# Patient Record
(demographics unavailable — no encounter records)

---

## 2025-01-07 NOTE — ASSESSMENT
[FreeTextEntry1] : Continue local wound care and pressure offloading.   Some excisional debridement of fibrinous exudate performed today and several sutures were removed.   Switch from Bacitracin to Medihoney.  Will see if collagenase is possible to cleanup wound base to facilitate healing by secondary intent.   Follow up 1 week.

## 2025-01-07 NOTE — HISTORY OF PRESENT ILLNESS
[de-identified] : 77 yo male s/p ray amputation of 1st toe, left foot.  Presents for follow up.    Denies pain.    On evaluation, there was tissue necrosis of the skin edges of the flap at the amputation site.  There is a layer of fibrinous exudate overlying the amputation site, with some granulation tissue underneath.  The lateral incision site is relatively well approximated with minimal separation and slough.  No purulence or drainage.

## 2025-01-07 NOTE — PHYSICAL EXAM
[de-identified] : NAD [de-identified] : DELFINO [de-identified] : Left foot: alicia scaling skin.  1st toe amputation site described as above in the HPI.  Opening is aobut 3x3 cm in total size.

## 2025-01-15 NOTE — PHYSICAL EXAM
[de-identified] : NAD [de-identified] : DELFINO [de-identified] : Left foot: alicia scaling skin.  1st toe amputation site described as above in the HPI.  Opening is aobut 3x3 cm in total size.  Fibrinous exudate at the base.

## 2025-01-15 NOTE — HISTORY OF PRESENT ILLNESS
[de-identified] : 75 yo male s/p ray amputation of 1st toe, left foot.  Presents for follow up.    Denies pain.    On evaluation, there was tissue necrosis of the skin edges of the flap at the amputation site.  There is a layer of fibrinous exudate overlying the amputation site, with some granulation tissue underneath.  The lateral incision site is relatively well approximated with minimal separation and slough.  No purulence or drainage.   [de-identified] : 1/15: here for wound check.  Sutures removed from the foot.  Less exudate in the wound bed.  Some sharp excisional debridement performed.  Open wound, triangular in shape about 3 cm in largest dimension.  Some separation of the incision along the wound flap.    Also note three areas of deep tissue injury, 2 on the dorsal surface of the foot and a third along the lateral aspect, MTP prominence 5th toe.  He also is developing a pressure ulcer at the medial malleolus from abrasion from the boot.

## 2025-01-15 NOTE — ASSESSMENT
[FreeTextEntry1] : Deep tissue injuries on the foot in addition to open wound.  Not infected. Continue local wound care.   Close monitoring of the DTI Recommend vascular surgery evaluation  Follow up 1 week

## 2025-01-22 NOTE — HISTORY OF PRESENT ILLNESS
[de-identified] : 77 yo male s/p ray amputation of 1st toe, left foot.  Presents for follow up.    Denies pain.    On evaluation, there was tissue necrosis of the skin edges of the flap at the amputation site.  There is a layer of fibrinous exudate overlying the amputation site, with some granulation tissue underneath.  The lateral incision site is relatively well approximated with minimal separation and slough.  No purulence or drainage.   [de-identified] : 1/15: here for wound check.  Sutures removed from the foot.  Less exudate in the wound bed.  Some sharp excisional debridement performed.  Open wound, triangular in shape about 3 cm in largest dimension.  Some separation of the incision along the wound flap.    Also note three areas of deep tissue injury, 2 on the dorsal surface of the foot and a third along the lateral aspect, MTP prominence 5th toe.  He also is developing a pressure ulcer at the medial malleolus from abrasion from the boot.    1/22: No major complaints.  Going to see primary and vascular surgery this week.

## 2025-01-22 NOTE — PHYSICAL EXAM
[de-identified] : NAD [de-identified] : DELFINO [de-identified] : Left foot: alicia scaling skin.  Wound opening remains 3x3 triangular shaped.  Minimal exudate. Granulation tissue forming.  No cellulitis.  Lateral foot: 2 stage 2 pressure ulcers, 2x2 and 2x1.  There are two stable scabs on the dorsum of the foot and a 1 x1 cm pressure ulcer on the medial malleolus.  All from pressure.

## 2025-01-22 NOTE — ASSESSMENT
[FreeTextEntry1] : Amputation site is healing appropriately by secondary intent.  Continue medihoney.   Deep tissue issues from pressure on the multiple parts of the left foot.  Padding when ambulating.   Referral to vascular surgery for arterial evaluation follow up 2 weeks.

## 2025-02-05 NOTE — HISTORY OF PRESENT ILLNESS
[de-identified] : 75 yo male s/p ray amputation of 1st toe, left foot.  Presents for follow up.    Denies pain.    On evaluation, there was tissue necrosis of the skin edges of the flap at the amputation site.  There is a layer of fibrinous exudate overlying the amputation site, with some granulation tissue underneath.  The lateral incision site is relatively well approximated with minimal separation and slough.  No purulence or drainage.   [de-identified] : 1/15: here for wound check.  Sutures removed from the foot.  Less exudate in the wound bed.  Some sharp excisional debridement performed.  Open wound, triangular in shape about 3 cm in largest dimension.  Some separation of the incision along the wound flap.    Also note three areas of deep tissue injury, 2 on the dorsal surface of the foot and a third along the lateral aspect, MTP prominence 5th toe.  He also is developing a pressure ulcer at the medial malleolus from abrasion from the boot.    1/22: No major complaints.  Going to see primary and vascular surgery this week.    2/5/25: pain only at the lateral foot site.  Has been doing local wound care.  Has not yet seen vascular surgeon.

## 2025-02-05 NOTE — PHYSICAL EXAM
[de-identified] : NAD [de-identified] : DELFINO [de-identified] : Left foot: alicia scaling skin.  Amptuation site - Triangular 5x3 cm with healthy granulation tissue at the base.  Not yet epithelialized.    2 secondary DTI - dorsum of the foot 2x2 cm, full thickness.  Necrotic skin was sharply debrided.  Lateral MTP site - 3x2 cm, moderately tender. No evidence of infection.  Some slough and exudate developing as the skin necrosis.  About half of the necrotic skin was able to be sharply debrided.

## 2025-02-05 NOTE — ASSESSMENT
[FreeTextEntry1] : Continue local wound care. Follow up 3 weeks.   Referral to Dr. Camejo for vascular evaluation.

## 2025-02-26 NOTE — ASSESSMENT
[FreeTextEntry1] : continue with medihoney to the wounds.   Follow up 1 week for continued local wound care and debridement as necessary.  Keflex for potentially developing cellulitis.

## 2025-02-26 NOTE — HISTORY OF PRESENT ILLNESS
[de-identified] : 75 yo male s/p ray amputation of 1st toe, left foot.  Presents for follow up.    Denies pain.    On evaluation, there was tissue necrosis of the skin edges of the flap at the amputation site.  There is a layer of fibrinous exudate overlying the amputation site, with some granulation tissue underneath.  The lateral incision site is relatively well approximated with minimal separation and slough.  No purulence or drainage.   [de-identified] : 1/15: here for wound check.  Sutures removed from the foot.  Less exudate in the wound bed.  Some sharp excisional debridement performed.  Open wound, triangular in shape about 3 cm in largest dimension.  Some separation of the incision along the wound flap.    Also note three areas of deep tissue injury, 2 on the dorsal surface of the foot and a third along the lateral aspect, MTP prominence 5th toe.  He also is developing a pressure ulcer at the medial malleolus from abrasion from the boot.    1/22: No major complaints.  Going to see primary and vascular surgery this week.    2/5/25: pain only at the lateral foot site.  Has been doing local wound care.  Has not yet seen vascular surgeon.    2/26: here for wound check.  Minimal pain except he noticed some this morning.  Has been local wound care with bacitracin to the wounds.  Reports that the wounds on the top of his foot and side had healed but worsened again with abrasion from the boot that he was wearing, so he stopped wearing boot.

## 2025-02-26 NOTE — PHYSICAL EXAM
[de-identified] : NAD [de-identified] : DELFINO [de-identified] : Left foot: alicia scaling skin.  Amptuation site - remains about the same size, with a mixture of granulation and fibrinous exudate, which was debrided.    There is a second ulcer on the top of the foot about 1x3 cm, full thickness, debrided to bleeding tissue.  There is a third ulcer on the lateral surface of the foot that is also full thickness, 1x2 cm  Of note, there is new blanching erythema of the foot, not noted on last exam.  Extends to just below the ankle.      2 secondary DTI - dorsum of the foot 2x2 cm, full thickness.  Necrotic skin was sharply debrided.  Lateral MTP site - 3x2 cm, moderately tender. No evidence of infection.  Some slough and exudate developing as the skin necrosis.  About half of the necrotic skin was able to be sharply debrided.